# Patient Record
(demographics unavailable — no encounter records)

---

## 2025-02-14 NOTE — REVIEW OF SYSTEMS
[Joint Pain] : joint pain [Back Pain] : back pain [Negative] : Eyes [All other systems negative] : All other systems negative

## 2025-02-14 NOTE — PHYSICAL EXAM
[Alert] : alert [No Acute Distress] : no acute distress [Normal Sclera/Conjunctiva] : normal sclera/conjunctiva [No Proptosis] : no proptosis [No LAD] : no lymphadenopathy [Supple] : the neck was supple [Thyroid Not Enlarged] : the thyroid was not enlarged [No Thyroid Nodules] : no palpable thyroid nodules [No Respiratory Distress] : no respiratory distress [No Accessory Muscle Use] : no accessory muscle use [Clear to Auscultation] : lungs were clear to auscultation bilaterally [Normal S1, S2] : normal S1 and S2 [Normal Rate] : heart rate was normal [Regular Rhythm] : with a regular rhythm [No Edema] : no peripheral edema [Normal Bowel Sounds] : normal bowel sounds [Not Tender] : non-tender [Not Distended] : not distended [Soft] : abdomen soft [Normal Anterior Cervical Nodes] : no anterior cervical lymphadenopathy [No Stigmata of Cushings Syndrome] : no stigmata of Cushings Syndrome [No Clubbing, Cyanosis] : no clubbing  or cyanosis of the fingernails [No Rash] : no rash [Left foot was examined, including] : left foot ~C was examined, including visual inspection with sensory and pulse exams [2+] : 2+ in the dorsalis pedis [Normal Reflexes] : deep tendon reflexes were 2+ and symmetric [Normal Affect] : the affect was normal [Normal Mood] : the mood was normal [Right foot was examined, including] : right foot ~C was examined, including visual inspection with sensory and pulse exams [Normal] : normal

## 2025-02-14 NOTE — ASSESSMENT
[Carbohydrate Consistent Diet] : carbohydrate consistent diet [Diabetes Foot Care] : diabetes foot care [Long Term Vascular Complications] : long term vascular complications of diabetes [Importance of Diet and Exercise] : importance of diet and exercise to improve glycemic control, achieve weight loss and improve cardiovascular health

## 2025-02-24 NOTE — PHYSICAL EXAM
[Normal Appearance] : normal appearance [Well Groomed] : well groomed [General Appearance - In No Acute Distress] : no acute distress [Edema] : no peripheral edema [Respiration, Rhythm And Depth] : normal respiratory rhythm and effort [Exaggerated Use Of Accessory Muscles For Inspiration] : no accessory muscle use [Abdomen Soft] : soft [Abdomen Tenderness] : non-tender [Costovertebral Angle Tenderness] : no ~M costovertebral angle tenderness [Normal Station and Gait] : the gait and station were normal for the patient's age [] : no rash [No Focal Deficits] : no focal deficits [Oriented To Time, Place, And Person] : oriented to person, place, and time [Affect] : the affect was normal [Mood] : the mood was normal [de-identified] : pvr- 93 ml

## 2025-02-24 NOTE — HISTORY OF PRESENT ILLNESS
[FreeTextEntry1] : Nov 2023:patient seen for dysuria seen in ER :urine oct 31,2023 - white cells and glucose , creat 1.02 and white count 5 , hx of abd sono ( 2015) ; kidneys normal urine repeated and cx negative but pyuria seen abx gven hx of psa ( may 2023) : 1.2  Now here after ENDO visif for DM f/u urine checked due to Dyuria , no hemturia, no no fever or N/V had back pain 2m before and ER visit - neg eval ENDO did not treat UTI as cx not back but was told to f/u when cx back : KLEBSIELLA admits to increased water, sugar levels high PSA ( june 2024) : 0.81  Here for f/u aftre recent psa of 3.5 some malodorous urne wiht good flwo , and feels emptya after void, adnitts to increased water, no frequency or urgency if holds too long, nocturia 2-3 x due to fluids in eveni g  bowelr habits good  steady flow and clear urine wiht fluids ( water) , not cloudy or hematuria some lower back pain /hip but not over bladder or flanks bilat

## 2025-02-24 NOTE — ASSESSMENT
[FreeTextEntry1] : 1- check psa again - Feb 11, 2025- PSA= 3.5  2- disucssed reasons for rise in psa ( he denies recent sex /ejaculation ) 3- check urine due to hx of utis  4- re-eval SHALA and bladder US ( declines cysto as requested last year for uti eval )

## 2025-03-05 NOTE — HISTORY OF PRESENT ILLNESS
[FreeTextEntry1] : Reji presents to the office today for evaluation for colon cancer screening.  He was last seen by Dr. Overton in 2023.  He feels well from a GI perspective, and denies any dysphagia, nausea, abdominal pain, change in bowel habits, GI bleeding, weight loss, or family history of colon cancer.  He normally moves his bowels 3-4 times a day.  He has never had a colonoscopy before.  He is currently taking antibiotics for a UTI.  The patient saw Dr. Overton in 2023 but was unable to complete the colonoscopy due to traveling.

## 2025-03-05 NOTE — ASSESSMENT
[FreeTextEntry1] : 1.  Encounter for colon cancer screening and average risk patient.  No prior colonoscopy. 2.  Type I DM.  Recs: Recent labs reviewed. - Patient was advised to undergo colonoscopy- procedure, risks, benefits, and alternatives were explained. Patient agreeable. Brochure given. Miralax prep.  Patient was advised to speak with endocrinologist about insulin dosing before colonoscopy.

## 2025-03-05 NOTE — PHYSICAL EXAM
[Alert] : alert [Sclera] : the sclera and conjunctiva were normal [Hearing Threshold Finger Rub Not Navarro] : hearing was normal [Normal Appearance] : the appearance of the neck was normal [No Respiratory Distress] : no respiratory distress [Auscultation Breath Sounds / Voice Sounds] : lungs were clear to auscultation bilaterally [Heart Rate And Rhythm] : heart rate was normal and rhythm regular [Normal S1, S2] : normal S1 and S2 [None] : no edema [Bowel Sounds] : normal bowel sounds [Abdomen Tenderness] : non-tender [No Masses] : no abdominal mass palpated [Abdomen Soft] : soft [No Focal Deficits] : no focal deficits [Oriented To Time, Place, And Person] : oriented to person, place, and time

## 2025-03-05 NOTE — PHYSICAL EXAM
[Alert] : alert [Sclera] : the sclera and conjunctiva were normal [Hearing Threshold Finger Rub Not Lucas] : hearing was normal [Normal Appearance] : the appearance of the neck was normal [No Respiratory Distress] : no respiratory distress [Auscultation Breath Sounds / Voice Sounds] : lungs were clear to auscultation bilaterally [Heart Rate And Rhythm] : heart rate was normal and rhythm regular [Normal S1, S2] : normal S1 and S2 [None] : no edema [Bowel Sounds] : normal bowel sounds [Abdomen Tenderness] : non-tender [No Masses] : no abdominal mass palpated [Abdomen Soft] : soft [No Focal Deficits] : no focal deficits [Oriented To Time, Place, And Person] : oriented to person, place, and time

## 2025-03-25 NOTE — HISTORY OF PRESENT ILLNESS
[FreeTextEntry1] : 55M HLD, DM1 (dx age 33), hx of UTI's who presents for cardiac evaluation  Eats healthy exercises regularly  2/14/25: Chol 127/TG 44/HDL 52/LDL 64  Fam hx: No premature CAD or SCD

## 2025-03-25 NOTE — DISCUSSION/SUMMARY
[EKG obtained to assist in diagnosis and management of assessed problem(s)] : EKG obtained to assist in diagnosis and management of assessed problem(s) [FreeTextEntry1] : Lp(a), hs-CRP, LYLY/PVR, carotid duplex for additional risk stratification given duration of diabetes and consideration of intensification of preventive treatments. has hx of elevated CPK  TST to assess for coronary ischemia  heart healthy lifestyle reviewed

## 2025-04-03 NOTE — HISTORY OF PRESENT ILLNESS
[FreeTextEntry1] : npa - spot on eyelid, scalp [de-identified] : Pt is a 55 year old M presenting for initial eval of:  1. Bump on R lower eyelid - present for many years, getting bothersome. not growing significantly. 2. Bumps on occipital scalp - get pus filled a few times per year, usually when pt manipulates the area. currently using Neosporin on the area. has not been active recently.  No personal or FH skin cancer

## 2025-04-03 NOTE — PHYSICAL EXAM
[Alert] : alert [Oriented x 3] : ~L oriented x 3 [Well Nourished] : well nourished [Conjunctiva Non-injected] : conjunctiva non-injected [No Visual Lymphadenopathy] : no visual  lymphadenopathy [No Clubbing] : no clubbing [No Edema] : no edema [No Bromhidrosis] : no bromhidrosis [No Chromhidrosis] : no chromhidrosis [FreeTextEntry3] : scarred hyperpigmented grouped papules on occipital scalp white smooth papule on medial R lower eyelid

## 2025-04-03 NOTE — ASSESSMENT
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 Pt prefers to monitor at this time  #AKN new diagnosis of uncertain prognosis - education and counseling - start BPO wash to AA daily - start Clindamycin 1% lotion to AA daily  RTC PRN Cardiovascular    Cardiovascular exam normal.         Dental    No notable dental history. Pulmonary    Pulmonary exam normal.                 Other findings            ASA: 1   Plan: general  NPO status verified and patient meets guidelines.     Po

## 2025-04-03 NOTE — HISTORY OF PRESENT ILLNESS
[FreeTextEntry1] : npa - spot on eyelid, scalp [de-identified] : Pt is a 55 year old M presenting for initial eval of:  1. Bump on R lower eyelid - present for many years, getting bothersome. not growing significantly. 2. Bumps on occipital scalp - get pus filled a few times per year, usually when pt manipulates the area. currently using Neosporin on the area. has not been active recently.  No personal or FH skin cancer

## 2025-04-03 NOTE — ASSESSMENT
[FreeTextEntry1] : #Papule on R lower eyelid ddx milia vs syringoma - discussed benign clinical appearance - given location, recommend occuloplastic surgeon. Pt prefers to monitor at this time  #AKN new diagnosis of uncertain prognosis - education and counseling - start BPO wash to AA daily - start Clindamycin 1% lotion to AA daily  RTC PRN

## 2025-04-07 NOTE — ASSESSMENT
[FreeTextEntry1] : 1- chekc urine 2- check psa - no FH of pca  3- renew keflex 4- may need cysto - patient uncirc but says easily retracts foreskin and cleans

## 2025-07-15 NOTE — ASSESSMENT
[Carbohydrate Consistent Diet] : carbohydrate consistent diet [Diabetes Foot Care] : diabetes foot care [Long Term Vascular Complications] : long term vascular complications of diabetes [Importance of Diet and Exercise] : importance of diet and exercise to improve glycemic control, achieve weight loss and improve cardiovascular health [FreeTextEntry1] : 56 y.o. male with h/o Type 1 DM uncontrolled, hyperlipidemia and vitamin D def.   1. Type 1 DM - Poor control with Hba1c of 8.9% today - Encouraged a carbohydrate consistent diet and exercise. - Stressed importance of compliance with basal bolus regimen and rotating sites. - Dexcom CGMS download was reviewed with 30% in range and 70% high. Strongly recommend use of CGMS.  - Strongly recommend patient to give bolus insulin 10-15 minutes prior to meals. Stressed importance of taking Novolog before eating. Recommend increasing Novolog to 14 units before meals for now.  - Will continue Lantus 30 units BID - Gave patient following correction scale to follow for bedtime Humalog 2 units if -300 Humalog 4 units if -350 Humalog 6 units if FS >351 - Encouraged wearing medical alert. - Will check CMP and urine alb/cr ratio today.   2. BP today is at goal - Will monitor for now - If does have proteinuria, will consider starting ACE-I or ARB   3. Hyperlipidemia - Would like to restart statin for CV risk reduction in the future but given elevated CPK would hold off for now. - Will check lipid panel and CPK. - Follow up cardiology to complete workup  4. Patient appears euthyroid. Will check TFTs.   5. Vitamin D def - Will check 25 vitamin D level - Will continue supplement    Follow up in 3 to 4 months. Follow up with GI for colon cancer screening

## 2025-07-15 NOTE — PHYSICAL EXAM
[Alert] : alert [No Acute Distress] : no acute distress [Normal Sclera/Conjunctiva] : normal sclera/conjunctiva [No Proptosis] : no proptosis [No LAD] : no lymphadenopathy [Supple] : the neck was supple [Thyroid Not Enlarged] : the thyroid was not enlarged [No Thyroid Nodules] : no palpable thyroid nodules [No Respiratory Distress] : no respiratory distress [No Accessory Muscle Use] : no accessory muscle use [Clear to Auscultation] : lungs were clear to auscultation bilaterally [Normal S1, S2] : normal S1 and S2 [Normal Rate] : heart rate was normal [Regular Rhythm] : with a regular rhythm [No Edema] : no peripheral edema [Normal Bowel Sounds] : normal bowel sounds [Not Tender] : non-tender [Not Distended] : not distended [Soft] : abdomen soft [Normal Anterior Cervical Nodes] : no anterior cervical lymphadenopathy [No Stigmata of Cushings Syndrome] : no stigmata of Cushings Syndrome [No Clubbing, Cyanosis] : no clubbing  or cyanosis of the fingernails [No Rash] : no rash [Right foot was examined, including] : right foot ~C was examined, including visual inspection with sensory and pulse exams [Left foot was examined, including] : left foot ~C was examined, including visual inspection with sensory and pulse exams [Normal] : normal [2+] : 2+ in the dorsalis pedis [Normal Reflexes] : deep tendon reflexes were 2+ and symmetric [Normal Affect] : the affect was normal [Normal Mood] : the mood was normal [Diminished Throughout Both Feet] : normal tactile sensation with monofilament testing throughout both feet [de-identified] : last foot exam in February 2025

## 2025-07-15 NOTE — HISTORY OF PRESENT ILLNESS
[Continuous Glucose Monitoring] : Continuous Glucose Monitoring: Yes [Dexcom] : Dexcom [FreeTextEntry1] : 56 y.o. male with h/o Type 1 DM uncontrolled diagnosed in 2003 here for follow up visit.   No acute events since the last visit.   Reports being sick in April 2020 but testing negative for COVID-19. Did get Pfizer vaccine X 2. Was having hand and feet swelling and followed with rheumatologist and was being worked up for possible Rheumatoid arthritis. Has not seen Rheumatology recently, reports pain improved. Saw GI and needs to schedule colonoscopy.  He tried Dexcom G7 but not able to use properly given connectivity issues.  Using Dexcom G6 since January 2018 but not always consistently. Rare hypoglycemia.  Saw ophthalmology in October 2024 with mild nonproliferative retinopathy and taking eye drop medication for glaucoma. Has some blurry vision in both eyes, better than before per patient. Denies neuropathy. Denies active issues with feet, following podiatry last saw 2022. No proteinuria.   Denies polyuria or polydipsia. Drinking plenty of water.   Taking Lantus 30 units BID and Novolog 10 units QAC. Reports not taking Novolog right before he eats his meals and also may eat and not bolus to after. Does not carb count. Reports physical work. Does not wear medical alert. Off statin since had elevated CPK.   Diet: For breakfast: whole grain bread or oats with fruits & coffee For lunch: chicken soup and salad For dinner: chicken or fish with rice and vegetables, pasta Snack at bedtime: more fruits like apples, grapes, watermelon and pineapple or nuts or chips  Drinks: water and milk   In regard to vitamin D def, taking vitamin D3 2,000 IU daily.  Also takes vitamin C, zinc, potassium and magnesium. [FreeTextEntry2] : 30 [FreeTextEntry3] : 70 [FreeTextEntry4] : 0 [de-identified] : 8.8%

## 2025-07-15 NOTE — HISTORY OF PRESENT ILLNESS
[Continuous Glucose Monitoring] : Continuous Glucose Monitoring: Yes [Dexcom] : Dexcom [FreeTextEntry1] : 56 y.o. male with h/o Type 1 DM uncontrolled diagnosed in 2003 here for follow up visit.   No acute events since the last visit.   Reports being sick in April 2020 but testing negative for COVID-19. Did get Pfizer vaccine X 2. Was having hand and feet swelling and followed with rheumatologist and was being worked up for possible Rheumatoid arthritis. Has not seen Rheumatology recently, reports pain improved. Saw GI and needs to schedule colonoscopy.  He tried Dexcom G7 but not able to use properly given connectivity issues.  Using Dexcom G6 since January 2018 but not always consistently. Rare hypoglycemia.  Saw ophthalmology in October 2024 with mild nonproliferative retinopathy and taking eye drop medication for glaucoma. Has some blurry vision in both eyes, better than before per patient. Denies neuropathy. Denies active issues with feet, following podiatry last saw 2022. No proteinuria.   Denies polyuria or polydipsia. Drinking plenty of water.   Taking Lantus 30 units BID and Novolog 10 units QAC. Reports not taking Novolog right before he eats his meals and also may eat and not bolus to after. Does not carb count. Reports physical work. Does not wear medical alert. Off statin since had elevated CPK.   Diet: For breakfast: whole grain bread or oats with fruits & coffee For lunch: chicken soup and salad For dinner: chicken or fish with rice and vegetables, pasta Snack at bedtime: more fruits like apples, grapes, watermelon and pineapple or nuts or chips  Drinks: water and milk   In regard to vitamin D def, taking vitamin D3 2,000 IU daily.  Also takes vitamin C, zinc, potassium and magnesium. [FreeTextEntry2] : 30 [FreeTextEntry3] : 70 [FreeTextEntry4] : 0 [de-identified] : 8.8%

## 2025-07-15 NOTE — REVIEW OF SYSTEMS
[Joint Pain] : joint pain [Back Pain] : back pain [Negative] : Eyes [All other systems negative] : All other systems negative [Fatigue] : no fatigue [Recent Weight Gain (___ Lbs)] : no recent weight gain [Recent Weight Loss (___ Lbs)] : no recent weight loss [Eye Pain] : no eye pain [Dysphagia] : no dysphagia [Dysphonia] : no dysphonia [Neck Pain] : no neck pain [Chest Pain] : no chest pain [Palpitations] : no palpitations [Shortness Of Breath] : no shortness of breath [Nausea] : no nausea [Vomiting] : no vomiting was observed [Constipation] : no constipation [Diarrhea] : no diarrhea [Polyuria] : no polyuria [Muscle Cramps] : no muscle cramps [Pain/Numbness of Digits] : no pain/numbness of digits [Depression] : no depression [Polydipsia] : no polydipsia [Cold Intolerance] : cold tolerant [Heat Intolerance] : heat tolerant [Swelling] : no swelling

## 2025-07-15 NOTE — PHYSICAL EXAM
[Alert] : alert [No Acute Distress] : no acute distress [Normal Sclera/Conjunctiva] : normal sclera/conjunctiva [No Proptosis] : no proptosis [No LAD] : no lymphadenopathy [Supple] : the neck was supple [Thyroid Not Enlarged] : the thyroid was not enlarged [No Thyroid Nodules] : no palpable thyroid nodules [No Respiratory Distress] : no respiratory distress [No Accessory Muscle Use] : no accessory muscle use [Clear to Auscultation] : lungs were clear to auscultation bilaterally [Normal S1, S2] : normal S1 and S2 [Normal Rate] : heart rate was normal [Regular Rhythm] : with a regular rhythm [No Edema] : no peripheral edema [Normal Bowel Sounds] : normal bowel sounds [Not Tender] : non-tender [Not Distended] : not distended [Soft] : abdomen soft [Normal Anterior Cervical Nodes] : no anterior cervical lymphadenopathy [No Stigmata of Cushings Syndrome] : no stigmata of Cushings Syndrome [No Clubbing, Cyanosis] : no clubbing  or cyanosis of the fingernails [No Rash] : no rash [Right foot was examined, including] : right foot ~C was examined, including visual inspection with sensory and pulse exams [Left foot was examined, including] : left foot ~C was examined, including visual inspection with sensory and pulse exams [Normal] : normal [2+] : 2+ in the dorsalis pedis [Normal Reflexes] : deep tendon reflexes were 2+ and symmetric [Normal Affect] : the affect was normal [Normal Mood] : the mood was normal [Diminished Throughout Both Feet] : normal tactile sensation with monofilament testing throughout both feet [de-identified] : last foot exam in February 2025